# Patient Record
Sex: FEMALE | Race: WHITE | NOT HISPANIC OR LATINO | Employment: OTHER | URBAN - METROPOLITAN AREA
[De-identification: names, ages, dates, MRNs, and addresses within clinical notes are randomized per-mention and may not be internally consistent; named-entity substitution may affect disease eponyms.]

---

## 2019-05-24 ENCOUNTER — HOSPITAL ENCOUNTER (EMERGENCY)
Facility: HOSPITAL | Age: 59
Discharge: HOME | End: 2019-05-24
Attending: INTERNAL MEDICINE
Payer: MEDICAID

## 2019-05-24 VITALS
HEIGHT: 68 IN | WEIGHT: 161 LBS | DIASTOLIC BLOOD PRESSURE: 77 MMHG | TEMPERATURE: 98 F | OXYGEN SATURATION: 97 % | BODY MASS INDEX: 24.4 KG/M2 | HEART RATE: 103 BPM | RESPIRATION RATE: 20 BRPM | SYSTOLIC BLOOD PRESSURE: 131 MMHG

## 2019-05-24 DIAGNOSIS — R91.8 MASS OF UPPER LOBE OF RIGHT LUNG: Primary | ICD-10-CM

## 2019-05-24 DIAGNOSIS — J18.9 PNEUMONIA OF RIGHT UPPER LOBE DUE TO INFECTIOUS ORGANISM: ICD-10-CM

## 2019-05-24 PROCEDURE — 96372 THER/PROPH/DIAG INJ SC/IM: CPT

## 2019-05-24 PROCEDURE — 71250 CT THORAX DX C-: CPT | Mod: 26,,, | Performed by: RADIOLOGY

## 2019-05-24 PROCEDURE — 71250 CT CHEST WITHOUT CONTRAST: ICD-10-PCS | Mod: 26,,, | Performed by: RADIOLOGY

## 2019-05-24 PROCEDURE — 63600175 PHARM REV CODE 636 W HCPCS: Mod: JG | Performed by: INTERNAL MEDICINE

## 2019-05-24 PROCEDURE — 71250 CT THORAX DX C-: CPT | Mod: TC

## 2019-05-24 PROCEDURE — 99284 EMERGENCY DEPT VISIT MOD MDM: CPT | Mod: 25

## 2019-05-24 RX ORDER — DOXYCYCLINE 100 MG/1
100 CAPSULE ORAL 2 TIMES DAILY
Qty: 20 CAPSULE | Refills: 0 | Status: SHIPPED | OUTPATIENT
Start: 2019-05-24 | End: 2019-06-03

## 2019-05-24 RX ADMIN — PENICILLIN G BENZATHINE 1.2 MILLION UNITS: 1200000 INJECTION, SUSPENSION INTRAMUSCULAR at 05:05

## 2019-05-30 NOTE — ED PROVIDER NOTES
Encounter Date: 5/24/2019       History     Chief Complaint   Patient presents with    Pneumonia     Sent by Urgent care     Patient presents with cough fever and crackles in the upper lobe.  She had an x-ray done at a local urgent care was sent here for further evaluation.  She has also had weight loss, is a smoker, and and hemoptysis.  Patient is from [place] and is going back to [place] in 2 days.  She is otherwise not terribly ill has not had high fever no chills no signs of systemic illness.  Patient has of heavy smoking history.  She is stated to have had a normal x-ray approximately 1 year ago.        Review of patient's allergies indicates:  No Known Allergies  Past Medical History:   Diagnosis Date    Asthma     Colon polyp     COPD (chronic obstructive pulmonary disease)     Hypertension     Hypothyroid      Past Surgical History:   Procedure Laterality Date    APPENDECTOMY      HYSTERECTOMY       Family History   Problem Relation Age of Onset    Brain cancer Brother      Social History     Tobacco Use    Smoking status: Current Every Day Smoker     Packs/day: 2.00     Types: Cigarettes   Substance Use Topics    Alcohol use: Not Currently    Drug use: Never     Review of Systems   Constitutional: Negative for fever.   HENT: Negative for sore throat.    Respiratory: Positive for cough and wheezing. Negative for shortness of breath.         History of COPD   Cardiovascular: Negative for chest pain.   Gastrointestinal: Negative for nausea.   Genitourinary: Negative for dysuria.   Musculoskeletal: Negative for back pain.   Skin: Negative for rash.   Neurological: Negative for weakness.   Hematological: Does not bruise/bleed easily.   All other systems reviewed and are negative.      Physical Exam     Initial Vitals [05/24/19 1607]   BP Pulse Resp Temp SpO2   131/77 103 20 98.1 °F (36.7 °C) 97 %      MAP       --         Physical Exam    Nursing note and vitals reviewed.  Constitutional:  Vital signs are normal. She appears well-developed and well-nourished. She is active and cooperative.   HENT:   Head: Normocephalic and atraumatic.   Eyes: Conjunctivae and lids are normal. Lids are everted and swept, no foreign bodies found.   Neck: Trachea normal, normal range of motion and full passive range of motion without pain. Neck supple.   Cardiovascular: Normal rate, regular rhythm, S1 normal, S2 normal, normal heart sounds, intact distal pulses and normal pulses.  No extrasystoles are present.    Pulmonary/Chest: She has wheezes. She has rhonchi. She has rales.   Abdominal: Soft. Normal appearance and bowel sounds are normal.   Musculoskeletal: Normal range of motion.   Neurological: She is alert. She has normal reflexes. GCS eye subscore is 4. GCS verbal subscore is 5. GCS motor subscore is 6.   Skin: Skin is warm, dry and intact. Capillary refill takes less than 2 seconds.   Psychiatric: She has a normal mood and affect. Her speech is normal and behavior is normal. Cognition and memory are normal.         ED Course   Procedures  Labs Reviewed - No data to display       Imaging Results           CT Chest Without Contrast (Final result)  Result time 05/24/19 17:03:45    Final result by Flakito Luciano MD (05/24/19 17:03:45)                 Impression:      Peripheral right upper lobe mass which may relate to dense consolidation infiltration or more likely pulmonary mass related to neoplasm.  This extends to the peripheral pleura as well as the minor fissure and there is some right hilar and subcarinal adenopathy.    A single additional nonspecific nodule is noted in the right lung base.    Small hiatal hernia      Electronically signed by: Flakito Luciano MD  Date:    05/24/2019  Time:    17:03             Narrative:    EXAMINATION:  CT CHEST WITHOUT CONTRAST    CLINICAL HISTORY:  Hemoptysis, persistent recurring and heavy smoker OR >41yo;Pleural effusion;    TECHNIQUE:  Low dose axial images, sagittal  and coronal reformations were obtained from the thoracic inlet to the lung bases. Contrast was not administered.    COMPARISON:  February 8, 2017    FINDINGS:  There is a peripheral right apical mass which peripherally contains air bronchograms and is centered in the right upper lobe.  In maximal dimensions this measures 4.8 cm transversely by 4.5 cm AP by 7.8 craniocaudal.  This is highly concerning for bronchogenic neoplasm but could also relate to multifocal pneumonia and consolidation.  This has a somewhat bilobed appearance and since the extends to the minor fissure and there is volume loss in the right upper lobe and also extends to the peripheral pleura.  Normal sized 8 mm upper right paratracheal lymph nodes are identified.  There is a borderline enlarged 10 mm low right paratracheal lymph node.  There is fullness of the right hilum suggesting associated right hilar adenopathy.  There is an enlarged subcarinal lymph node measuring 13 mm.  There is no evidence of a pleural or pericardial effusion.  There is evidence of a small hiatal hernia.  The adrenal glands are unremarkable.  There is right renal pyelectasis.  In the peripheral right lower lobe there is a triangular nodule measuring 7 mm seen on series number 3, image number 68. This report was flagged in Epic as abnormal.                              X-Rays:   Independently Interpreted Readings:   Chest X-Ray: Normal heart size. There is a lung mass present in the RUL.     Medical Decision Making:   Clinical Tests:   Radiological Study: Ordered and Reviewed  ED Management:  Patient presents with cough hemoptysis and a infiltrate on chest x-ray.  She underwent CT scanning which showed evidence of a pulmonary mass and distal pneumonia.  Most likely neoplasm.  Diagnosis and treatment options were outlined to the patient.  She was adamant that going home to [place] is the best course of action and she will resume diagnosis and treatment of this problem  on discharge. She is discharged on antibiotics to  with her primary care in 3 days.                      Clinical Impression:       ICD-10-CM ICD-9-CM   1. Mass of upper lobe of right lung R91.8 786.6   2. Pneumonia of right upper lobe due to infectious organism J18.1 486                                Micha Alfaro MD  05/30/19 0705